# Patient Record
Sex: MALE | Race: WHITE | NOT HISPANIC OR LATINO | URBAN - METROPOLITAN AREA
[De-identification: names, ages, dates, MRNs, and addresses within clinical notes are randomized per-mention and may not be internally consistent; named-entity substitution may affect disease eponyms.]

---

## 2025-01-26 ENCOUNTER — OFFICE VISIT (OUTPATIENT)
Dept: URGENT CARE | Age: 30
End: 2025-01-26
Payer: COMMERCIAL

## 2025-01-26 ENCOUNTER — ANCILLARY PROCEDURE (OUTPATIENT)
Dept: URGENT CARE | Age: 30
End: 2025-01-26
Payer: COMMERCIAL

## 2025-01-26 VITALS
RESPIRATION RATE: 20 BRPM | BODY MASS INDEX: 42.5 KG/M2 | WEIGHT: 315 LBS | SYSTOLIC BLOOD PRESSURE: 162 MMHG | HEART RATE: 104 BPM | OXYGEN SATURATION: 96 % | DIASTOLIC BLOOD PRESSURE: 117 MMHG | TEMPERATURE: 98.7 F

## 2025-01-26 DIAGNOSIS — R03.0 ELEVATED BLOOD PRESSURE READING: Primary | ICD-10-CM

## 2025-01-26 DIAGNOSIS — M79.641 RIGHT HAND PAIN: ICD-10-CM

## 2025-01-26 DIAGNOSIS — S92.351A CLOSED DISPLACED FRACTURE OF FIFTH METATARSAL BONE OF RIGHT FOOT, INITIAL ENCOUNTER: ICD-10-CM

## 2025-01-26 PROCEDURE — 99204 OFFICE O/P NEW MOD 45 MIN: CPT

## 2025-01-26 PROCEDURE — 73130 X-RAY EXAM OF HAND: CPT | Mod: RIGHT SIDE

## 2025-01-26 NOTE — PROGRESS NOTES
Subjective   Patient ID: Carlos Johnson is a 29 y.o. male. They present today with a chief complaint of Hand Injury (Pt tripped and fell and injured his rt hand Thursday morning).    History of Present Illness  HPI    Past Medical History  Allergies as of 01/26/2025 - Reviewed 01/26/2025   Allergen Reaction Noted    Acetaminophen-codeine Rash 01/01/1998       (Not in a hospital admission)       No past medical history on file.    No past surgical history on file.     reports that he has been smoking cigarettes. He has never used smokeless tobacco.    Review of Systems  Review of Systems                               Objective    Vitals:    01/26/25 1735   BP: (!) 167/120   BP Location: Left arm   Patient Position: Sitting   BP Cuff Size: Large adult   Pulse: 104   Resp: 20   Temp: 37.1 °C (98.7 °F)   TempSrc: Oral   SpO2: 96%   Weight: (!) 154 kg (340 lb)     No LMP for male patient.    Physical Exam    Procedures    Point of Care Test & Imaging Results from this visit  No results found for this visit on 01/26/25.   No results found.    Diagnostic study results (if any) were reviewed by CAESAR Holguin.    Assessment/Plan   Allergies, medications, history, and pertinent labs/EKGs/Imaging reviewed by CAESAR Holguin.     Medical Decision Making  ***    Orders and Diagnoses  There are no diagnoses linked to this encounter.    Medical Admin Record      Patient disposition: { Disposition:[id number]}    Electronically signed by CAESAR Holguin  5:46 PM       01/26/25.   No results found.    Diagnostic study results (if any) were reviewed by CAESAR Holguin.    Assessment/Plan   Allergies, medications, history, and pertinent labs/EKGs/Imaging reviewed by CAESAR Holguin.     Medical Decision Making  29-year-old male presents for pain in right hand that happened on Thursday.  3 days ago patient reports he hit his right hand on a desk.  Patient reports pain is increased.  Denies numbness or tingling in hand.  Patient is in no acute distress vital signs are stable blood pressure mildly elevated.  On exam pulses are palpable there is mild edema dorsal hand.  Patient has been using his hand and taking ibuprofen.  Pulses are palpable there is no erythema no open wounds.  X-ray right hand shows acute nondisplaced mildly comminuted fracture of the neck of the fifth metacarpal with mild angulation and surrounding soft tissue swelling.  There is no apparent intra-articular extension of the fracture.  No subcutaneous air or foreign body.  Patient put in coles splint patient is to elevate hand and take ibuprofen as directed patient denies tingling or numbness in hand patient is go to the emergency department tonight if any worsening symptoms any increased swelling any numbness or tingling any inability to use his hand.  Patient is to follow-up with orthopedics tomorrow if he cannot go to the orthopedics tomorrow he is to go to the emergency department patient agrees with plan of care patient left in stable condition.    Orders and Diagnoses  There are no diagnoses linked to this encounter.    Medical Admin Record      Patient disposition: Physician's Office    Electronically signed by CAESAR Holguin  5:46 PM

## 2025-01-26 NOTE — PATIENT INSTRUCTIONS
Wear splint, go to orthopedics tomorrow if cannot get into orthopedics go to emergency room for further management, take ibuprofen as needed, elevate, rest hand.    Follow up with primary care doctor regarding elevated blood pressure, go to emergency department with any worsening symptoms.

## 2025-01-26 NOTE — LETTER
January 26, 2025     Patient: Carlos Johnson   YOB: 1995   Date of Visit: 1/26/2025       To Whom It May Concern:    Carlos Johnson was seen in my clinic on 1/26/2025 at 7:00 pm. Please excuse Carlos for his absence from work on this day 01/26/25. Patient is unable to use R hand due to fracture. Patient is not able to use hand until after follow up with orthopedic appointment and evaluation with them.    If you have any questions or concerns, please don't hesitate to call.         Sincerely,         Jana Garcia, APRN-CNP        CC: No Recipients

## 2025-01-27 ENCOUNTER — OFFICE VISIT (OUTPATIENT)
Dept: ORTHOPEDIC SURGERY | Facility: HOSPITAL | Age: 30
End: 2025-01-27
Payer: COMMERCIAL

## 2025-01-27 VITALS — WEIGHT: 315 LBS | HEIGHT: 75 IN | BODY MASS INDEX: 39.17 KG/M2

## 2025-01-27 DIAGNOSIS — S62.316A DISPLACED FRACTURE OF BASE OF FIFTH METACARPAL BONE, RIGHT HAND, INITIAL ENCOUNTER FOR CLOSED FRACTURE: Primary | ICD-10-CM

## 2025-01-27 PROCEDURE — 99214 OFFICE O/P EST MOD 30 MIN: CPT | Performed by: PHYSICIAN ASSISTANT

## 2025-01-27 PROCEDURE — 3008F BODY MASS INDEX DOCD: CPT | Performed by: PHYSICIAN ASSISTANT

## 2025-01-27 PROCEDURE — 99204 OFFICE O/P NEW MOD 45 MIN: CPT | Performed by: PHYSICIAN ASSISTANT

## 2025-01-27 ASSESSMENT — PAIN SCALES - GENERAL: PAINLEVEL_OUTOF10: 6

## 2025-01-27 ASSESSMENT — PAIN - FUNCTIONAL ASSESSMENT: PAIN_FUNCTIONAL_ASSESSMENT: 0-10

## 2025-01-27 NOTE — PATIENT INSTRUCTIONS
You were placed in a cast. Keep your cast clean and dry. It is important to keep your cast elevated to reduce swelling.    Patient will increase their dietary calcium intake (milk,yogurt) and should get 1200 mg of calcium per day. They will also take a vitamin D supplement.    Follow up with hand

## 2025-01-27 NOTE — LETTER
January 27, 2025     Patient: Carlos Johnson   YOB: 1995   Date of Visit: 1/27/2025       To Whom It May Concern:    It is my medical opinion that Carlos Johnson  may return to computer-related activities while in his cast .    If you have any questions or concerns, please don't hesitate to call.         Sincerely,        Joni Marsh PA-C    CC: No Recipients

## 2025-01-28 ASSESSMENT — ENCOUNTER SYMPTOMS
ARTHRALGIAS: 1
JOINT SWELLING: 1

## 2025-02-11 NOTE — PROGRESS NOTES
NPV-   History of Present Illness  29 y.o.male presents today at same day walk in clinic for right hand  pain  1. Displaced fracture of base of fifth metacarpal bone, right hand, initial encounter for closed fracture           RHD  Mechanism of injury: fall on his fist  Date of Injury/Pain: 1/23/25  Location of pain: 5th knuckle   Frequency of Pain: worse with movement  Associated symptoms? Swelling.  Previous treatment?  [facility], xrays    27 point review of systems negative except what is stated in HPI    No past medical history on file.    No past surgical history on file.    Social History     Tobacco Use    Smoking status: Every Day     Types: Cigarettes    Smokeless tobacco: Never   Vaping Use    Vaping status: Some Days    Substances: Nicotine   Substance Use Topics    Alcohol use: Yes    Drug use: Never       Constitutional Exam: patient's height and weight reviewed, well-kempt  Psychiatric Exam: alert and oriented x 3, appropriate mood and behavior  Eye Exam: RAI, EOMI  Pulmonary Exam: breathing non-labored, no apparent distress  Lymphatic exam: no appreciable lymphadenopathy in the lower extremities  Cardiovascular exam: DP pulses 2+ bilaterally, PT 2+ bilaterally, toes are pink with good capillary refill, no pitting edema  Skin exam: no open lesions, rashes, abrasions or ulcerations  Neurological exam: sensation to light touch intact in both lower extremities in peripheral and dermatomal distributions (except for any abnormalities noted in musculoskeletal exam)        On examination of the right wrist and hand;  Normal alignment;  Minimal swelling and  ecchymosis   Normal wrist extension, Normal ROM in wrist flexion, pronation and supination, 2nd-4th ROM finger and thumb normal; decreased 5th finger extension and flexion without cross over   Normal strength in   wrist extension, Normal ROM in wrist flexion, pronation and supination, finger and thumb strength normal; normal  strength  Tenderness to  palpation: 5th mcp joint. 5th metacarpal head and neck   No tenderness over the scaphoid  NVI, good cap refill    I personally reviewed the patient's x-ray images and reports of the right wrist/hand. The xrays show a minimally displaced angulated fracture of the 5th metacarpal neck.      1. Displaced fracture of base of fifth metacarpal bone, right hand, initial encounter for closed fracture            ASSESSMENT: right hand 5th metacarpal boxer's fracture    PLAN: I discussed with the patient the differential diagnosis, complex overuse and degenerative related nature of the condition(s) and available treatment option(s). Patient was placed in a well padded fiberglass cast ulnar gutter. They were given cast instructions. Patient will increase their dietary calcium intake (milk,yogurt) and should get 1200 mg of calcium per day. They will also take a vitamin D supplement. .  All the patient's questions were answered. The patient agrees with the above plan.  Follow up with hand specialist

## 2025-02-12 ENCOUNTER — HOSPITAL ENCOUNTER (OUTPATIENT)
Dept: RADIOLOGY | Facility: HOSPITAL | Age: 30
Discharge: HOME | End: 2025-02-12
Payer: COMMERCIAL

## 2025-02-12 ENCOUNTER — OFFICE VISIT (OUTPATIENT)
Dept: ORTHOPEDIC SURGERY | Facility: HOSPITAL | Age: 30
End: 2025-02-12
Payer: COMMERCIAL

## 2025-02-12 VITALS — BODY MASS INDEX: 39.17 KG/M2 | WEIGHT: 315 LBS | HEIGHT: 75 IN

## 2025-02-12 DIAGNOSIS — S62.316A DISPLACED FRACTURE OF BASE OF FIFTH METACARPAL BONE, RIGHT HAND, INITIAL ENCOUNTER FOR CLOSED FRACTURE: ICD-10-CM

## 2025-02-12 PROCEDURE — 99214 OFFICE O/P EST MOD 30 MIN: CPT | Performed by: STUDENT IN AN ORGANIZED HEALTH CARE EDUCATION/TRAINING PROGRAM

## 2025-02-12 PROCEDURE — 73130 X-RAY EXAM OF HAND: CPT | Mod: RT

## 2025-02-12 PROCEDURE — L3809 WHFO W/O JOINTS PRE OTS: HCPCS | Performed by: STUDENT IN AN ORGANIZED HEALTH CARE EDUCATION/TRAINING PROGRAM

## 2025-02-12 PROCEDURE — 3008F BODY MASS INDEX DOCD: CPT | Performed by: STUDENT IN AN ORGANIZED HEALTH CARE EDUCATION/TRAINING PROGRAM

## 2025-02-12 ASSESSMENT — PAIN - FUNCTIONAL ASSESSMENT: PAIN_FUNCTIONAL_ASSESSMENT: 0-10

## 2025-02-12 ASSESSMENT — PAIN DESCRIPTION - DESCRIPTORS: DESCRIPTORS: ACHING;SORE

## 2025-02-12 ASSESSMENT — PAIN SCALES - GENERAL: PAINLEVEL_OUTOF10: 5 - MODERATE PAIN

## 2025-02-12 NOTE — LETTER
February 12, 2025     Patient: Carlos Johnson   YOB: 1995   Date of Visit: 2/12/2025       To Whom It May Concern:    It is my medical opinion that Carlos Johnson may return to light duty immediately with the following restrictions: must keep brace on at all times, no lifting, pulling, or pushing greater than 5lbs .    He was seen in my clinic 2/12/2025.    If you have any questions or concerns, please don't hesitate to call.         Sincerely,        Abhinav Peter MD    CC: No Recipients

## 2025-02-12 NOTE — PROGRESS NOTES
CHIEF COMPLAINT: Right hand injury  DOI: 1/23/2025  DOS: None      HISTORY OF PRESENT ILLNESS    This is a very pleasant 29-year-old male, right-hand-dominant, works as an , lives with his mother, active cigarette smoker, denies diabetes denies anticoagulation use denies significant past cardiopulmonary history denies prior surgeries to his right hand.  He explains on above date he mechanical ground-level fall on ice resulting in his hand smacking the ground.  He did not think anything of it the night of the next day noticed bruising swelling tenderness so he presented to urgent care.  He was told he had a boxer's fracture was placed in a fiberglass cast.  Pain is well-controlled in the cast.  Denies any new numbness tingling paresthesias.    PHYSICAL EXAM    Extremities / Musculoskeletal:                  Right hand:  Cast removed.  No gross deformity no active skin lesions no open wounds.  Modest edema about the ulnar aspect of his hand.  Mild ecchymosis about the palm.  Tender palpation about the fifth metacarpal neck.  Nontender palpation about the CMC joints.  No static or dynamic scissoring.  Normal resting cascade normal tenodesis effect with wrist flexion extension. Motor intact to radian/PIN/median/AIN/Ulnar nerve distributions. Sensation intact to light touch in the median/ulnar/radial nerve distributions. 2+ radial pulse at the wrist, hand and all digits are warm and well-perfused with a brisk capillary refill of <2s.         IMAGING / LABS / EMGs:    Right hand x-ray series obtained on 2/12/2025 independent reviewed demonstrating right fifth metacarpal neck fracture with slight volar angulation.  CMC and MCP joints appear well reduced.  Otherwise no signs of injury.      No past medical history on file.    Medication Documentation Review Audit       Reviewed by Yanna Vilchis LPN (Licensed Nurse) on 02/12/25 at 1356      Medication Order Taking? Sig Documenting Provider Last  Dose Status            No Medications to Display                                   Allergies   Allergen Reactions    Acetaminophen-Codeine Rash       No past surgical history on file.      ASSESSMENT:   Acute, closed, right fifth metacarpal neck fracture with slight volar angulation, neurovascularly intact    We discussed treatment options.  Given the current alignment of the fracture I recommend continuing with nonoperative treatment.  He is already spent about 3 weeks in a formal cast and is fingers are slightly stiff I recommend transition to contender brace.    PLAN:   Patient fit for removable contender brace  That should be worn at all times except for showering and hand hygiene and work on gentle range of motion exercises at home  No heavy lifting or high impact activities  Letter for work provided to return to work with restrictions the right side    Follow-up in: 3 to 4 weeks  XR at next visit: Yes right hand x-ray series out of brace    The diagnosis and treatment plan were reviewed with the patient. All questions were answered. The patient verbalized understanding of the treatment plan. There were no barriers to understanding identified.     Note dictated with [employer] software.  Completed without full type editing and sent to avoid delay.    Abhinav Peter M.D.    Department of Orthopaedics  Hand/Upper Extremity  Phone: [phone]  Appt. Phone: [phone]\

## 2025-03-12 ENCOUNTER — APPOINTMENT (OUTPATIENT)
Dept: RADIOLOGY | Facility: HOSPITAL | Age: 30
End: 2025-03-12
Payer: COMMERCIAL

## 2025-03-12 ENCOUNTER — APPOINTMENT (OUTPATIENT)
Dept: ORTHOPEDIC SURGERY | Facility: HOSPITAL | Age: 30
End: 2025-03-12
Payer: COMMERCIAL

## 2025-03-12 DIAGNOSIS — S62.316A DISPLACED FRACTURE OF BASE OF FIFTH METACARPAL BONE, RIGHT HAND, INITIAL ENCOUNTER FOR CLOSED FRACTURE: ICD-10-CM

## 2025-03-14 ENCOUNTER — OFFICE VISIT (OUTPATIENT)
Dept: ORTHOPEDIC SURGERY | Facility: CLINIC | Age: 30
End: 2025-03-14
Payer: COMMERCIAL

## 2025-03-14 ENCOUNTER — HOSPITAL ENCOUNTER (OUTPATIENT)
Dept: RADIOLOGY | Facility: CLINIC | Age: 30
Discharge: HOME | End: 2025-03-14
Payer: COMMERCIAL

## 2025-03-14 VITALS — HEIGHT: 75 IN | BODY MASS INDEX: 39.17 KG/M2 | WEIGHT: 315 LBS

## 2025-03-14 DIAGNOSIS — S62.316A DISPLACED FRACTURE OF BASE OF FIFTH METACARPAL BONE, RIGHT HAND, INITIAL ENCOUNTER FOR CLOSED FRACTURE: Primary | ICD-10-CM

## 2025-03-14 DIAGNOSIS — S62.316A DISPLACED FRACTURE OF BASE OF FIFTH METACARPAL BONE, RIGHT HAND, INITIAL ENCOUNTER FOR CLOSED FRACTURE: ICD-10-CM

## 2025-03-14 PROCEDURE — 3008F BODY MASS INDEX DOCD: CPT | Performed by: STUDENT IN AN ORGANIZED HEALTH CARE EDUCATION/TRAINING PROGRAM

## 2025-03-14 PROCEDURE — 73130 X-RAY EXAM OF HAND: CPT | Mod: RT

## 2025-03-14 PROCEDURE — 99214 OFFICE O/P EST MOD 30 MIN: CPT | Performed by: STUDENT IN AN ORGANIZED HEALTH CARE EDUCATION/TRAINING PROGRAM

## 2025-03-14 ASSESSMENT — PAIN - FUNCTIONAL ASSESSMENT: PAIN_FUNCTIONAL_ASSESSMENT: NO/DENIES PAIN

## 2025-03-14 NOTE — LETTER
March 14, 2025     Patient: Carlos Johnson   YOB: 1995   Date of Visit: 3/14/2025       To Whom It May Concern:    It is my medical opinion that Carlos Johnson may return to light duty immediately with the following restrictions: No pushing, pulling or lifting over 20lbs with the right hand until further evaluated by physician .    If you have any questions or concerns, please don't hesitate to call.         Sincerely,        Abhinav Peter MD    CC: No Recipients

## 2025-03-14 NOTE — PROGRESS NOTES
CHIEF COMPLAINT: Right hand injury  DOI: 1/23/2025  DOS: None      HISTORY OF PRESENT ILLNESS    This is a very pleasant 29-year-old male, right-hand-dominant, works as an , lives with his mother, active cigarette smoker, denies diabetes denies anticoagulation use denies significant past cardiopulmonary history denies prior surgeries to his right hand.  He explains on above date he mechanical ground-level fall on ice resulting in his hand smacking the ground.  He did not think anything of it the night of the next day noticed bruising swelling tenderness so he presented to urgent care.  He was told he had a boxer's fracture was placed in a fiberglass cast.  Pain is well-controlled in the cast.  Denies any new numbness tingling paresthesias.    Interval update 3/14/2025:  Patient return for scheduled follow-up visit.  He has been reportedly compliant with contender bracing.  He has had no significant pain complaints.  He is taking the brace off at home in a very controlled setting with no issues.  He has been on light duty with work    PHYSICAL EXAM    Extremities / Musculoskeletal:                  Right hand:  .  No gross deformity no active skin lesions no open wounds.  Edema resolved, ecchymosis resolved nontender to palpation about the fifth metacarpal neck.  Nontender palpation about the CMC joints.  No static or dynamic scissoring.  Normal resting cascade normal tenodesis effect with wrist flexion extension.  Approximately 10 degree extensor lag of the small finger compared to the unaffected finger rays.  No static or dynamic scissoring motor intact to radian/PIN/median/AIN/Ulnar nerve distributions. Sensation intact to light touch in the median/ulnar/radial nerve distributions. 2+ radial pulse at the wrist, hand and all digits are warm and well-perfused with a brisk capillary refill of <2s.         IMAGING / LABS / EMGs:    Right hand x-ray series obtained on 2/12/2025 independent  reviewed demonstrating right fifth metacarpal neck fracture with slight volar angulation.  CMC and MCP joints appear well reduced.  Otherwise no signs of injury.    Updated right hand x-ray series obtained on 3/14/2025 independent reviewed demonstrating stable alignment of right fifth metacarpal neck fracture with signs of interval callus formation.  Well reduced CMC and MCP joints    ASSESSMENT:   Acute, closed, right fifth metacarpal neck fracture with slight volar angulation, neurovascularly intact    We discussed treatment options.  Given the current alignment of the fracture I recommend continuing with nonoperative treatment.  He is already spent about 3 weeks in a formal cast and is fingers are slightly stiff I recommend transition to contender brace.    Interval update 3/14/2025:  Patient is doing well.  We are at a point where he can begin to wean out of the contender brace.  I would still avoid any heavy lifting or high impact activities for another 4 weeks.    PLAN:   Letter provided to return to work with restrictions    Follow-up in: 4 weeks  XR at next visit: Yes right hand x-ray series out of brace    The diagnosis and treatment plan were reviewed with the patient. All questions were answered. The patient verbalized understanding of the treatment plan. There were no barriers to understanding identified.     Note dictated with [employer] software.  Completed without full type editing and sent to avoid delay.    Abhinav Peter M.D.    Department of Orthopaedics  Hand/Upper Extremity  Phone: [phone]  Appt. Phone: [phone]\

## 2025-03-19 ENCOUNTER — APPOINTMENT (OUTPATIENT)
Dept: ORTHOPEDIC SURGERY | Facility: HOSPITAL | Age: 30
End: 2025-03-19
Payer: COMMERCIAL

## 2025-04-11 ENCOUNTER — APPOINTMENT (OUTPATIENT)
Dept: ORTHOPEDIC SURGERY | Facility: CLINIC | Age: 30
End: 2025-04-11
Payer: COMMERCIAL

## 2025-04-11 ENCOUNTER — HOSPITAL ENCOUNTER (OUTPATIENT)
Dept: RADIOLOGY | Facility: CLINIC | Age: 30
Discharge: HOME | End: 2025-04-11
Payer: COMMERCIAL

## 2025-04-11 ENCOUNTER — OFFICE VISIT (OUTPATIENT)
Dept: ORTHOPEDIC SURGERY | Facility: CLINIC | Age: 30
End: 2025-04-11
Payer: COMMERCIAL

## 2025-04-11 DIAGNOSIS — S62.316A DISPLACED FRACTURE OF BASE OF FIFTH METACARPAL BONE, RIGHT HAND, INITIAL ENCOUNTER FOR CLOSED FRACTURE: ICD-10-CM

## 2025-04-11 PROCEDURE — 99214 OFFICE O/P EST MOD 30 MIN: CPT | Performed by: STUDENT IN AN ORGANIZED HEALTH CARE EDUCATION/TRAINING PROGRAM

## 2025-04-11 PROCEDURE — 73130 X-RAY EXAM OF HAND: CPT | Mod: RT

## 2025-04-11 ASSESSMENT — PAIN - FUNCTIONAL ASSESSMENT: PAIN_FUNCTIONAL_ASSESSMENT: NO/DENIES PAIN

## 2025-04-11 NOTE — LETTER
April 11, 2025     Patient: Carlos Johnson   YOB: 1995   Date of Visit: 4/11/2025       To Whom It May Concern:    It is my medical opinion that Carlos Johnson may return to full duty immediately with no restrictions.    Patient was seen in my clinic on 4/11/25, please excuse him for this absence.     If you have any questions or concerns, please don't hesitate to call.         Sincerely,        Abhinav Peter MD    CC: No Recipients   Statement Selected

## 2025-04-11 NOTE — PROGRESS NOTES
CHIEF COMPLAINT: Right hand injury  DOI: 1/23/2025  DOS: None      HISTORY OF PRESENT ILLNESS    This is a very pleasant 29-year-old male, right-hand-dominant, works as an , lives with his mother, active cigarette smoker, denies diabetes denies anticoagulation use denies significant past cardiopulmonary history denies prior surgeries to his right hand.  He explains on above date he mechanical ground-level fall on ice resulting in his hand smacking the ground.  He did not think anything of it the night of the next day noticed bruising swelling tenderness so he presented to urgent care.  He was told he had a boxer's fracture was placed in a fiberglass cast.  Pain is well-controlled in the cast.  Denies any new numbness tingling paresthesias.    Interval update 3/14/2025:  Patient return for scheduled follow-up visit.  He has been reportedly compliant with contender bracing.  He has had no significant pain complaints.  He is taking the brace off at home in a very controlled setting with no issues.  He has been on light duty with work    Interval update 4/11/2025:  Patient returns scheduled follow-up.  Reports to be doing very well.  He is returned to work in a limited fashion.  He has no pain whatsoever.  He denies any numbness tingling paresthesias      PHYSICAL EXAM    Extremities / Musculoskeletal:                  Right hand:  No gross deformity no active skin lesions no open wounds.  Edema resolved, ecchymosis resolved nontender to palpation about the fifth metacarpal neck.  Nontender palpation about the CMC joints.  No static or dynamic scissoring.  Normal resting cascade normal tenodesis effect with wrist flexion extension.  Approximately 10 degree extensor lag of the small finger compared to the unaffected finger rays.  No static or dynamic scissoring motor intact to radian/PIN/median/AIN/Ulnar nerve distributions. Sensation intact to light touch in the median/ulnar/radial nerve  distributions. 2+ radial pulse at the wrist, hand and all digits are warm and well-perfused with a brisk capillary refill of <2s.         IMAGING / LABS / EMGs:    Right hand x-ray series obtained on 2/12/2025 independent reviewed demonstrating right fifth metacarpal neck fracture with slight volar angulation.  CMC and MCP joints appear well reduced.  Otherwise no signs of injury.    Updated right hand x-ray series obtained on 3/14/2025 independent reviewed demonstrating stable alignment of right fifth metacarpal neck fracture with signs of interval callus formation.  Well reduced CMC and MCP joints    Updated right hand x-ray series obtained on 4/11/2025 and evaluated demonstrating stable alignment of fifth metacarpal neck fracture with robust callus.    ASSESSMENT:   Acute, closed, right fifth metacarpal neck fracture with slight volar angulation, neurovascularly intact    We discussed treatment options.  Given the current alignment of the fracture I recommend continuing with nonoperative treatment.  He is already spent about 3 weeks in a formal cast and is fingers are slightly stiff I recommend transition to contender brace.    Interval update 3/14/2025:  Patient is doing well.  We are at a point where he can begin to wean out of the contender brace.  I would still avoid any heavy lifting or high impact activities for another 4 weeks.    Interval update 4/11/2025:  Patient doing very well.  He is radiographically and clinically healed.  He is cleared to return to work without any restrictions.  Letter provided for work    Follow-up in: Although we do not need a scheduled follow-up visit at this time, I encouraged the patient to return to clinic if they have any concerns or issues whatsoever. The patient was provided with my office's contact information.     XR at next visit: none    The diagnosis and treatment plan were reviewed with the patient. All questions were answered. The patient verbalized understanding  of the treatment plan. There were no barriers to understanding identified.     Note dictated with [employer] software.  Completed without full type editing and sent to avoid delay.    Abhinav Peter M.D.    Department of Orthopaedics  Hand/Upper Extremity  Phone: [phone]  Appt. Phone: [phone]\

## 2025-07-18 ENCOUNTER — APPOINTMENT (OUTPATIENT)
Dept: PRIMARY CARE | Facility: CLINIC | Age: 30
End: 2025-07-18
Payer: COMMERCIAL

## 2025-08-08 ENCOUNTER — APPOINTMENT (OUTPATIENT)
Dept: PRIMARY CARE | Facility: CLINIC | Age: 30
End: 2025-08-08
Payer: COMMERCIAL

## 2025-08-22 ENCOUNTER — APPOINTMENT (OUTPATIENT)
Dept: PRIMARY CARE | Facility: CLINIC | Age: 30
End: 2025-08-22
Payer: COMMERCIAL

## 2025-09-05 ENCOUNTER — APPOINTMENT (OUTPATIENT)
Dept: PRIMARY CARE | Facility: CLINIC | Age: 30
End: 2025-09-05
Payer: COMMERCIAL

## 2025-09-19 ENCOUNTER — APPOINTMENT (OUTPATIENT)
Dept: PRIMARY CARE | Facility: CLINIC | Age: 30
End: 2025-09-19
Payer: COMMERCIAL